# Patient Record
Sex: FEMALE | Race: WHITE | Employment: STUDENT | URBAN - METROPOLITAN AREA
[De-identification: names, ages, dates, MRNs, and addresses within clinical notes are randomized per-mention and may not be internally consistent; named-entity substitution may affect disease eponyms.]

---
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
## 2021-06-12 ENCOUNTER — OFFICE VISIT (OUTPATIENT)
Dept: FAMILY MEDICINE CLINIC | Age: 21
End: 2021-06-12
Payer: COMMERCIAL

## 2021-06-12 VITALS
WEIGHT: 191.4 LBS | HEIGHT: 68 IN | TEMPERATURE: 98.9 F | SYSTOLIC BLOOD PRESSURE: 124 MMHG | OXYGEN SATURATION: 98 % | RESPIRATION RATE: 18 BRPM | BODY MASS INDEX: 29.01 KG/M2 | DIASTOLIC BLOOD PRESSURE: 80 MMHG | HEART RATE: 108 BPM

## 2021-06-12 DIAGNOSIS — R53.83 FATIGUE, UNSPECIFIED TYPE: ICD-10-CM

## 2021-06-12 DIAGNOSIS — R52 GENERALIZED BODY ACHES: Primary | ICD-10-CM

## 2021-06-12 LAB — HETEROPHILE ANTIBODIES: NORMAL

## 2021-06-12 PROCEDURE — 99213 OFFICE O/P EST LOW 20 MIN: CPT | Performed by: NURSE PRACTITIONER

## 2021-06-12 PROCEDURE — G8419 CALC BMI OUT NRM PARAM NOF/U: HCPCS | Performed by: NURSE PRACTITIONER

## 2021-06-12 PROCEDURE — 86308 HETEROPHILE ANTIBODY SCREEN: CPT | Performed by: NURSE PRACTITIONER

## 2021-06-12 PROCEDURE — G8427 DOCREV CUR MEDS BY ELIG CLIN: HCPCS | Performed by: NURSE PRACTITIONER

## 2021-06-12 PROCEDURE — 4004F PT TOBACCO SCREEN RCVD TLK: CPT | Performed by: NURSE PRACTITIONER

## 2021-06-12 RX ORDER — LANOLIN ALCOHOL/MO/W.PET/CERES
3-6 CREAM (GRAM) TOPICAL NIGHTLY
COMMUNITY
End: 2021-09-10 | Stop reason: ALTCHOICE

## 2021-06-12 RX ORDER — NAPROXEN 500 MG/1
500 TABLET ORAL 2 TIMES DAILY WITH MEALS
Qty: 20 TABLET | Refills: 0 | Status: SHIPPED | OUTPATIENT
Start: 2021-06-12 | End: 2021-09-10 | Stop reason: ALTCHOICE

## 2021-06-12 RX ORDER — ERGOCALCIFEROL (VITAMIN D2) 1250 MCG
CAPSULE ORAL
COMMUNITY
Start: 2021-02-24 | End: 2021-10-25

## 2021-06-12 RX ORDER — ETHYNODIOL DIACETATE AND ETHINYL ESTRADIOL 1 MG-35MCG
KIT ORAL
COMMUNITY
Start: 2021-02-22 | End: 2022-02-17

## 2021-06-12 SDOH — ECONOMIC STABILITY: FOOD INSECURITY: WITHIN THE PAST 12 MONTHS, YOU WORRIED THAT YOUR FOOD WOULD RUN OUT BEFORE YOU GOT MONEY TO BUY MORE.: OFTEN TRUE

## 2021-06-12 SDOH — ECONOMIC STABILITY: FOOD INSECURITY: WITHIN THE PAST 12 MONTHS, THE FOOD YOU BOUGHT JUST DIDN'T LAST AND YOU DIDN'T HAVE MONEY TO GET MORE.: OFTEN TRUE

## 2021-06-12 ASSESSMENT — ENCOUNTER SYMPTOMS
COUGH: 0
ALLERGIC/IMMUNOLOGIC NEGATIVE: 1
BACK PAIN: 1
SHORTNESS OF BREATH: 0
NAUSEA: 0
WHEEZING: 0
RHINORRHEA: 0
SORE THROAT: 0
PHOTOPHOBIA: 0
VOMITING: 0
EYES NEGATIVE: 1
ABDOMINAL PAIN: 0

## 2021-06-12 ASSESSMENT — PATIENT HEALTH QUESTIONNAIRE - PHQ9
SUM OF ALL RESPONSES TO PHQ QUESTIONS 1-9: 2
SUM OF ALL RESPONSES TO PHQ9 QUESTIONS 1 & 2: 2
SUM OF ALL RESPONSES TO PHQ QUESTIONS 1-9: 2
2. FEELING DOWN, DEPRESSED OR HOPELESS: 1
SUM OF ALL RESPONSES TO PHQ QUESTIONS 1-9: 2
1. LITTLE INTEREST OR PLEASURE IN DOING THINGS: 1

## 2021-06-12 ASSESSMENT — SOCIAL DETERMINANTS OF HEALTH (SDOH): HOW HARD IS IT FOR YOU TO PAY FOR THE VERY BASICS LIKE FOOD, HOUSING, MEDICAL CARE, AND HEATING?: SOMEWHAT HARD

## 2021-06-12 NOTE — PROGRESS NOTES
Subjective  Loy Honeycutt, 24 y.o. female presents today with:  Chief Complaint   Patient presents with    Generalized Body Aches     x 2 days     Fatigue     x 2 days     Headache     x 2 days     Health Maintenance     went over     Pharyngitis     x 2 days        Patient presents with a 2-day history of generalized body aches including bilateral shoulder pain, bilateral upper extremity pain, bilateral lower extremity pain, fatigue, eye pain, headache, and sore throat. Patient states that this has been an ongoing issue on and off for several months but has come up again in the past 2 days. In the past, patient reports that she was given Neurontin which did not help. She also had multiple lab tests done that came back unremarkable. Patient states that she was prescribed an order for physical therapy but felt like therapy would not help her. She did not follow-up with her doctor that prescribed the Neurontin to explain that the Neurontin dosage given was ineffective. No follow-up since that appointment. Patient denies recent fever, cough, chest pain, nausea, or vomiting. Patient's past medical history includes bipolar, ADHD, and anxiety. Review of Systems   Constitutional: Positive for fatigue. Negative for appetite change, fever and unexpected weight change. HENT: Negative for congestion, rhinorrhea and sore throat. Eyes: Negative. Negative for photophobia and visual disturbance. Respiratory: Negative for cough, shortness of breath and wheezing. Cardiovascular: Negative for chest pain and leg swelling. Gastrointestinal: Negative for abdominal pain, nausea and vomiting. Endocrine: Negative. Negative for polydipsia, polyphagia and polyuria. Genitourinary: Negative for difficulty urinating, dysuria and pelvic pain. Musculoskeletal: Positive for arthralgias, back pain and myalgias. Negative for gait problem. Skin: Negative. Negative for rash. Allergic/Immunologic: Negative. Neurological: Positive for headaches. Negative for dizziness, facial asymmetry, speech difficulty and weakness. Hematological: Negative. Psychiatric/Behavioral: Negative. Negative for behavioral problems and confusion. Past Medical History:   Diagnosis Date    ADHD     Anxiety     Bipolar 1 disorder ([address]     Mood disorder ([facility])      No past surgical history on file. Social History     Socioeconomic History    Marital status: Single     Spouse name: Not on file    Number of children: Not on file    Years of education: Not on file    Highest education level: Not on file   Occupational History    Not on file   Tobacco Use    Smoking status: Never Smoker    Smokeless tobacco: Never Used   Substance and Sexual Activity    Alcohol use: No    Drug use: No    Sexual activity: Never   Other Topics Concern    Not on file   Social History Narrative    Not on file     Social Determinants of Health     Financial Resource Strain: Medium Risk    Difficulty of Paying Living Expenses: Somewhat hard   Food Insecurity: Food Insecurity Present    Worried About Running Out of Food in the Last Year: Often true    Kell of Food in the Last Year: Often true   Transportation Needs:     Lack of Transportation (Medical):  Lack of Transportation (Non-Medical):    Physical Activity:     Days of Exercise per Week:     Minutes of Exercise per Session:    Stress:     Feeling of Stress :    Social Connections:     Frequency of Communication with Friends and Family:     Frequency of Social Gatherings with Friends and Family:     Attends Quaker Services:     Active Member of Clubs or Organizations:     Attends Club or Organization Meetings:     Marital Status:    Intimate Partner Violence:     Fear of Current or Ex-Partner:     Emotionally Abused:     Physically Abused:     Sexually Abused:      No family history on file.   No Known Allergies  Current Outpatient Medications   Medication Sig Dispense Refill    ergocalciferol (ERGOCALCIFEROL) 1.25 MG (73177 UT) capsule TAKE ONE CAPSULE BY MOUTH WEEKLY      ethynodiol-ethinyl estradiol (KELNOR 1/35) 1-35 MG-MCG per tablet TAKE ONE TABLET BY MOUTH EVERY DAY FOR 28 DAYS STARTING ON THE FIRST DAY OF THE MENSTRUAL CYCLE      melatonin 3 MG TABS tablet Take 3-6 mg by mouth nightly      naproxen (NAPROSYN) 500 MG tablet Take 1 tablet by mouth 2 times daily (with meals) for 20 doses 20 tablet 0    CJ ADVANCED ASPIRIN EX ST PO Take by mouth (Patient not taking: Reported on 6/12/2021)       No current facility-administered medications for this visit. PMH, Surgical Hx, Family Hx, and Social Hxreviewed and updated. Health Maintenance reviewed. Objective    Vitals:    06/12/21 1257   BP: 124/80   Site: Right Upper Arm   Position: Sitting   Cuff Size: Medium Adult   Pulse: 108   Resp: 18   Temp: 98.9 °F (37.2 °C)   TempSrc: Oral   SpO2: 98%   Weight: 191 lb 6.4 oz (86.8 kg)   Height: 5' 8\" (1.727 m)       Physical Exam  Vitals and nursing note reviewed. Constitutional:       General: She is not in acute distress. Appearance: She is well-developed. HENT:      Right Ear: Tympanic membrane and external ear normal.      Left Ear: Tympanic membrane normal.      Nose: Nose normal.      Mouth/Throat:      Mouth: Mucous membranes are moist.      Pharynx: Oropharynx is clear. No pharyngeal swelling, oropharyngeal exudate or posterior oropharyngeal erythema. Eyes:      Pupils: Pupils are equal, round, and reactive to light. Cardiovascular:      Rate and Rhythm: Normal rate and regular rhythm. Pulses: Normal pulses. Heart sounds: Normal heart sounds. Pulmonary:      Effort: Pulmonary effort is normal. No respiratory distress. Breath sounds: Normal breath sounds. No wheezing, rhonchi or rales. Abdominal:      General: Bowel sounds are normal. There is no distension. Palpations: Abdomen is soft. There is no mass.       Tenderness: There is no abdominal tenderness. There is no guarding or rebound. Hernia: No hernia is present. Musculoskeletal:         General: Normal range of motion. Cervical back: Normal range of motion. Skin:     General: Skin is warm and dry. Capillary Refill: Capillary refill takes less than 2 seconds. Neurological:      Mental Status: She is alert and oriented to person, place, and time. Assessment & Plan  Discussed the following plan of care with patient:  You have take naproxen as prescribed twice a day as needed but not more than 5 days continuously. In addition, you may use Tylenol 650 to 1000 mg every 6 hours as needed for pain. Keep well-hydrated and rest over the next couple days. If you develop a high fever, severe abdominal pain with nausea and vomiting then go to the ED. Otherwise, establish care with PCP and follow up plan of care to address ongoing health issues and health maintenance. Appointment was made with patient to establish care with a PCP prior to her leaving today. Diagnosis Orders   1. Generalized body aches  POCT Infectious mononucleosis Abs (mono)   2.  Fatigue, unspecified type  POCT Infectious mononucleosis Abs (mono)     Orders Placed This Encounter   Procedures    POCT Infectious mononucleosis Abs (mono)     Orders Placed This Encounter   Medications    naproxen (NAPROSYN) 500 MG tablet     Sig: Take 1 tablet by mouth 2 times daily (with meals) for 20 doses     Dispense:  20 tablet     Refill:  0     Medications Discontinued During This Encounter   Medication Reason    cloNIDine (CATAPRES) 0.3 MG tablet LIST CLEANUP    carBAMazepine (TEGRETOL) 200 MG tablet LIST CLEANUP    hydrOXYzine (ATARAX) 25 MG tablet LIST CLEANUP    levocetirizine (XYZAL) 5 MG tablet LIST CLEANUP    loratadine-pseudoephedrine (CLARITIN-D 12HR) 5-120 MG per extended release tablet LIST CLEANUP    loratadine-pseudoephedrine (CLARITIN-D 12HR) 5-120 MG per extended release tablet LIST CLEANUP    risperiDONE (RISPERDAL) 2 MG tablet     sertraline (ZOLOFT) 100 MG tablet LIST CLEANUP     Return if symptoms worsen or fail to improve. Reviewed with the patient: current clinical status, medications, activities and diet. Side effects, adverse effects of the medication prescribed today, as well as treatment plan/ rationale and resultexpectations have been discussed with the patient who expresses understanding and desires to proceed. Close follow up to evaluate treatment resultsand for coordination of care. I have reviewed the patient's medical history in detail and updated the computerized patient record.     Ham Marroquin, APRN - CNP

## 2021-06-12 NOTE — PATIENT INSTRUCTIONS
You have take naproxen as prescribed twice a day as needed but not more than 5 days continuously. In addition, you may use Tylenol 650 to 1000 mg every 6 hours as needed for pain. Keep well-hydrated and rest over the next couple days. If you develop a high fever, severe abdominal pain with nausea and vomiting then go to the ED. Otherwise, establish care with PCP and follow up plan of care to address ongoing health issues and health maintenance.

## 2021-09-24 ENCOUNTER — OFFICE VISIT (OUTPATIENT)
Dept: INTERNAL MEDICINE | Age: 21
End: 2021-09-24
Payer: MEDICAID

## 2021-09-24 VITALS
DIASTOLIC BLOOD PRESSURE: 64 MMHG | OXYGEN SATURATION: 98 % | HEIGHT: 68 IN | WEIGHT: 187 LBS | SYSTOLIC BLOOD PRESSURE: 118 MMHG | BODY MASS INDEX: 28.34 KG/M2 | HEART RATE: 78 BPM | TEMPERATURE: 98.5 F

## 2021-09-24 DIAGNOSIS — R51.9 FRONTAL HEADACHE: Primary | ICD-10-CM

## 2021-09-24 PROCEDURE — G8419 CALC BMI OUT NRM PARAM NOF/U: HCPCS | Performed by: PHYSICIAN ASSISTANT

## 2021-09-24 PROCEDURE — G8427 DOCREV CUR MEDS BY ELIG CLIN: HCPCS | Performed by: PHYSICIAN ASSISTANT

## 2021-09-24 PROCEDURE — 99203 OFFICE O/P NEW LOW 30 MIN: CPT | Performed by: PHYSICIAN ASSISTANT

## 2021-09-24 PROCEDURE — 1036F TOBACCO NON-USER: CPT | Performed by: PHYSICIAN ASSISTANT

## 2021-09-24 RX ORDER — LEVONORGESTREL 1.5 MG/1
TABLET ORAL
COMMUNITY
Start: 2021-09-20 | End: 2021-09-24 | Stop reason: ALTCHOICE

## 2021-09-24 RX ORDER — TOPIRAMATE 50 MG/1
50 TABLET, FILM COATED ORAL NIGHTLY
Qty: 30 TABLET | Refills: 0 | Status: SHIPPED | OUTPATIENT
Start: 2021-09-24 | End: 2022-02-17

## 2021-09-24 ASSESSMENT — ENCOUNTER SYMPTOMS: RESPIRATORY NEGATIVE: 1

## 2021-09-24 NOTE — PROGRESS NOTES
22    Brenda Morgan (: 2000) is a 22 y.o. female, NEW  patient, here for evaluation of the following chief complaint(s):  Establish Care (no prev pcp ) and Migraine (x's 1+yrs. wants tx for it)      HPI      Migraines   States HA's started > 1 yr ago   Fh- unknown  Headaches feel like a heartbeat , , frontal   Daily occurrence   Stress caused them to get worse   Can cause nausea   Has had her eyesight checked          Review of Systems   Constitutional: Negative. HENT: Negative. Respiratory: Negative. Genitourinary: Negative. Musculoskeletal: Negative. Neurological: Positive for dizziness and headaches. Negative for numbness. Prior to Visit Medications    Medication Sig Taking?  Authorizing Provider   topiramate (TOPAMAX) 50 MG tablet Take 1 tablet by mouth nightly Yes THOR Carlos   diphenhydrAMINE HCl, Sleep, (SLEEP AID) 50 MG CAPS Take by mouth Yes Historical Provider, MD   ergocalciferol (ERGOCALCIFEROL) 1.25 MG (74130 UT) capsule TAKE ONE CAPSULE BY MOUTH WEEKLY Yes Historical Provider, MD   ethynodiol-ethinyl estradiol Sherl Diego ) 1-35 MG-MCG per tablet TAKE ONE TABLET BY MOUTH EVERY DAY FOR 28 DAYS STARTING ON THE FIRST DAY OF THE MENSTRUAL CYCLE Yes Historical Provider, MD   CJ ADVANCED ASPIRIN EX ST PO Take by mouth  Yes Historical Provider, MD        No Known Allergies    Social History     Socioeconomic History    Marital status: Single     Spouse name: Not on file    Number of children: Not on file    Years of education: Not on file    Highest education level: Not on file   Occupational History    Not on file   Tobacco Use    Smoking status: Never Smoker    Smokeless tobacco: Never Used   Substance and Sexual Activity    Alcohol use: No    Drug use: No    Sexual activity: Never   Other Topics Concern    Not on file   Social History Narrative    Not on file     Social Determinants of Health     Financial Resource Strain: Medium Risk    Difficulty of Paying Living Expenses: Somewhat hard   Food Insecurity: Food Insecurity Present    Worried About Running Out of Food in the Last Year: Often true    Kell of Food in the Last Year: Often true   Transportation Needs:     Lack of Transportation (Medical):  Lack of Transportation (Non-Medical):    Physical Activity:     Days of Exercise per Week:     Minutes of Exercise per Session:    Stress:     Feeling of Stress :    Social Connections:     Frequency of Communication with Friends and Family:     Frequency of Social Gatherings with Friends and Family:     Attends Druze Services:     Active Member of Clubs or Organizations:     Attends Club or Organization Meetings:     Marital Status:    Intimate Partner Violence:     Fear of Current or Ex-Partner:     Emotionally Abused:     Physically Abused:     Sexually Abused:        Vitals:    09/24/21 1537   BP: 118/64   Site: Left Upper Arm   Position: Sitting   Cuff Size: Large Adult   Pulse: 78   Temp: 98.5 °F (36.9 °C)   TempSrc: Temporal   SpO2: 98%   Weight: 187 lb (84.8 kg)   Height: 5' 8\" (1.727 m)        Physical Exam  Vitals reviewed. Constitutional:       Appearance: Normal appearance. HENT:      Head: Normocephalic and atraumatic. Eyes:      Extraocular Movements: Extraocular movements intact. Conjunctiva/sclera: Conjunctivae normal.      Pupils: Pupils are equal, round, and reactive to light. Cardiovascular:      Rate and Rhythm: Normal rate and regular rhythm. Musculoskeletal:      Cervical back: Normal range of motion and neck supple. Neurological:      Mental Status: She is oriented to person, place, and time. Psychiatric:         Mood and Affect: Mood normal.         Behavior: Behavior normal.         Thought Content: Thought content normal.         Judgment: Judgment normal.               ASSESSMENT/PLAN:  1.  Frontal headache  - will try Topamax, and see her back in 2 weeks  - if no improvement may try SSRI, for stress   - topiramate (TOPAMAX) 50 MG tablet; Take 1 tablet by mouth nightly  Dispense: 30 tablet; Refill: 0          Return in about 2 weeks (around 10/8/2021), or f/u headaches. An  electronic signature was used to authenticate this note.     --THOR Torres on 1/29/2021 at 12:12 PM

## 2022-02-17 ENCOUNTER — OFFICE VISIT (OUTPATIENT)
Dept: INTERNAL MEDICINE | Age: 22
End: 2022-02-17
Payer: MEDICAID

## 2022-02-17 VITALS
WEIGHT: 174 LBS | TEMPERATURE: 98 F | HEART RATE: 87 BPM | OXYGEN SATURATION: 99 % | SYSTOLIC BLOOD PRESSURE: 102 MMHG | DIASTOLIC BLOOD PRESSURE: 62 MMHG | HEIGHT: 67 IN | BODY MASS INDEX: 27.31 KG/M2

## 2022-02-17 DIAGNOSIS — N92.6 MISSED MENSES: Primary | ICD-10-CM

## 2022-02-17 LAB
CONTROL: NORMAL
PREGNANCY TEST URINE, POC: NORMAL

## 2022-02-17 PROCEDURE — G8427 DOCREV CUR MEDS BY ELIG CLIN: HCPCS | Performed by: PHYSICIAN ASSISTANT

## 2022-02-17 PROCEDURE — G8484 FLU IMMUNIZE NO ADMIN: HCPCS | Performed by: PHYSICIAN ASSISTANT

## 2022-02-17 PROCEDURE — G8419 CALC BMI OUT NRM PARAM NOF/U: HCPCS | Performed by: PHYSICIAN ASSISTANT

## 2022-02-17 PROCEDURE — 1036F TOBACCO NON-USER: CPT | Performed by: PHYSICIAN ASSISTANT

## 2022-02-17 PROCEDURE — 81025 URINE PREGNANCY TEST: CPT | Performed by: PHYSICIAN ASSISTANT

## 2022-02-17 PROCEDURE — 99213 OFFICE O/P EST LOW 20 MIN: CPT | Performed by: PHYSICIAN ASSISTANT

## 2022-02-17 RX ORDER — ACETAMINOPHEN AND CODEINE PHOSPHATE 120; 12 MG/5ML; MG/5ML
SOLUTION ORAL
COMMUNITY
Start: 2021-12-15

## 2022-02-17 NOTE — PROGRESS NOTES
Osvaldo Perry (: 2000) is a 22 y.o. female, Established patient, here for evaluation of the following chief complaint(s):  Amenorrhea        ASSESSMENT/PLAN:  1. Missed menses  - POCT urine pregnancy- neg   - Uziel Winfred, DO, OB-GYN, [address]        No follow-ups on file. SUBJECTIVE/OBJECTIVE:  HPI      Missed mences. No periods since Nov  Sexually active - no   She is on Micronor     Review of Systems   Constitutional: Negative. HENT: Negative. Respiratory: Negative. Genitourinary: Positive for menstrual problem. Physical Exam  Constitutional:       Appearance: Normal appearance. Cardiovascular:      Rate and Rhythm: Normal rate. Heart sounds: Normal heart sounds. Neurological:      Mental Status: She is alert and oriented to person, place, and time. Psychiatric:         Mood and Affect: Mood normal. Affect is flat. Behavior: Behavior normal.         Vitals:    22 1149   BP: 102/62   Pulse: 87   Temp: 98 °F (36.7 °C)   TempSrc: Infrared   SpO2: 99%   Weight: 174 lb (78.9 kg)   Height: 5' 7\" (1.702 m)                 An electronic signature was used to authenticate this note.     --THOR Valdez

## 2022-02-21 ENCOUNTER — TELEPHONE (OUTPATIENT)
Dept: INTERNAL MEDICINE | Age: 22
End: 2022-02-21

## 2022-02-21 ASSESSMENT — ENCOUNTER SYMPTOMS: RESPIRATORY NEGATIVE: 1

## 2022-02-21 NOTE — TELEPHONE ENCOUNTER
----- Message from Tammi parker sent at 2/19/2022  2:35 PM EST -----  Subject: Message to Provider    QUESTIONS  Information for Provider? pt would like a call about her refferal  ---------------------------------------------------------------------------  --------------  [address]  What is the best way for the office to contact you? OK to leave message on   voicemail  Preferred Call Back Phone Number? [phone]  ---------------------------------------------------------------------------  --------------  SCRIPT ANSWERS  Relationship to Patient?  Self